# Patient Record
Sex: FEMALE | Race: BLACK OR AFRICAN AMERICAN | NOT HISPANIC OR LATINO | Employment: FULL TIME | URBAN - METROPOLITAN AREA
[De-identification: names, ages, dates, MRNs, and addresses within clinical notes are randomized per-mention and may not be internally consistent; named-entity substitution may affect disease eponyms.]

---

## 2018-09-12 ENCOUNTER — HOSPITAL ENCOUNTER (EMERGENCY)
Facility: HOSPITAL | Age: 48
Discharge: HOME | End: 2018-09-12
Attending: EMERGENCY MEDICINE

## 2018-09-12 VITALS
OXYGEN SATURATION: 97 % | SYSTOLIC BLOOD PRESSURE: 143 MMHG | DIASTOLIC BLOOD PRESSURE: 63 MMHG | RESPIRATION RATE: 16 BRPM | TEMPERATURE: 98 F | WEIGHT: 156 LBS | HEART RATE: 67 BPM

## 2018-09-12 DIAGNOSIS — M25.441 FINGER JOINT SWELLING, RIGHT: ICD-10-CM

## 2018-09-12 PROCEDURE — 99283 EMERGENCY DEPT VISIT LOW MDM: CPT

## 2018-09-12 RX ORDER — ASPIRIN 325 MG
325 TABLET, DELAYED RELEASE (ENTERIC COATED) ORAL DAILY
COMMUNITY

## 2018-09-12 NOTE — ED PROVIDER NOTES
Encounter Date: 9/12/2018    SCRIBE #1 NOTE: Tori MOREIRA, am scribing for, and in the presence of, Makenna Segovia PA-C.       History     Chief Complaint   Patient presents with    Hand Pain     RIGHT hand middle finger pain and swelling. Difficult to flex. Denies injury       Time seen by provider: 10:46 AM on 09/12/2018    Clarice Marin is a 48 y.o. female with no pertinent PMHx who presents to the ED with complaints of right middle finger pain with an onset x 2-3 days PTA. The patient relays that she has been having constant and throbbing pain in her right 3rd digit. She denies any recent injury. 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 Patient states that the finger is very swollen, which makes it difficult for her to bend. The pain is worse in the middle of the finger. She claims that she could not sleep last night due to the pain. She denies any history of gout. Patient denies any relief. She states that nothing makes the pain better.         The history is provided by the patient.     Review of patient's allergies indicates:  No Known Allergies  History reviewed. No pertinent past medical history.  History reviewed. No pertinent surgical history.  History reviewed. No pertinent family history.  Social History     Tobacco Use    Smoking status: Current Every Day Smoker     Packs/day: 0.50    Smokeless tobacco: Never Used   Substance Use Topics    Alcohol use: Yes    Drug use: No     Review of Systems   Constitutional: Negative for activity change, appetite change, chills and fever.   HENT: Negative for congestion, rhinorrhea and sore throat.    Eyes: Negative for redness and visual disturbance.   Respiratory: Negative for cough, chest tightness and shortness of breath.    Cardiovascular: Negative for chest pain.   Gastrointestinal: Negative for abdominal pain, diarrhea, nausea and vomiting.   Genitourinary: Negative for dysuria and  frequency.   Musculoskeletal: Positive for arthralgias (right 3rd digit ). Negative for back pain, neck pain and neck stiffness.   Skin: Negative for rash.        Positive right 3rd digit swelling    Neurological: Negative for dizziness, syncope, numbness and headaches.       Physical Exam     Initial Vitals [09/12/18 0921]   BP Pulse Resp Temp SpO2   (!) 143/63 67 16 98.4 °F (36.9 °C) 97 %      MAP       --         Physical Exam    Nursing note and vitals reviewed.  Constitutional: She appears well-developed and well-nourished. She is cooperative.  Non-toxic appearance. She does not have a sickly appearance.   HENT:   Head: Normocephalic and atraumatic.   Right Ear: Abnromal external ear normal.   Left Ear: Abnormal external ear normal.   Nose: Nose abnormal.   Mouth/Throat: Oropharynx is clear and moist.   Eyes: Conjunctivae and lids are normal. Pupils are equal, round, and reactive to light.   Neck: Normal range of motion and full passive range of motion without pain. Neck supple.   Cardiovascular: Normal rate, regular rhythm and normal heart sounds. Exam reveals no gallop and no friction rub.    No murmur heard.  Pulmonary/Chest: Breath sounds normal. She has no wheezes. She has no rhonchi. She has no rales.   Abdominal: Normal appearance. There is no rigidity.   Musculoskeletal:        Right hand: She exhibits decreased range of motion (3rd right digit, due to pain), tenderness (3rd right digit) and swelling (3rd right digit).   There is no erythema or warmth noted to the 3rd right digit.    Neurological: She is alert and oriented to person, place, and time.   Skin: Skin is warm and dry. Abrasion noted. No rash noted. No erythema.   Capillary refill is less than 3 seconds.   There are old superficial abrasions noted diffusely to the the bilateral hands.          ED Course   Procedures  Labs Reviewed - No data to display       Imaging Results          X-Ray Hand 3 View Right (Final result)  Result time 09/12/18  10:38:06   Procedure changed from X-Ray Hand 2 View Right     Final result by Alejandra Zarco MD (09/12/18 10:38:06)                 Impression:      No acute fracture or dislocation right hand.      Electronically signed by: Alejandra Zarco MD  Date:    09/12/2018  Time:    10:38             Narrative:    EXAMINATION:  XR HAND COMPLETE 3 VIEW RIGHT    CLINICAL HISTORY:  finger joint swelling ;  Effusion, right hand    TECHNIQUE:  Three views right hand    COMPARISON:  None    FINDINGS:  No acute fracture or dislocation right hand.  No focal osseous destruction.  No erosive changes.                                 Medical Decision Making:   History:   Old Medical Records: I decided to obtain old medical records.  Clinical Tests:   Radiological Study: Ordered and Reviewed       APC / Resident Notes:   Urgent evaluation of a 48-year-old female who presents with swelling and tenderness to the 3rd digit of the right hand.  She denies specific injury. 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  There is no erythema or warmth.  She is neurovascularly intact.  X-ray is normal. Patient eloped from the ER prior to completion of evaluation.        Scribe Attestation:   Scribe #1: I performed the above scribed service and the documentation accurately describes the services I performed. I attest to the accuracy of the note.    I, Makenna Segovia PA-C, personally performed the services described in this documentation. All medical record entries made by the scribe were at my direction and in my presence.  I have reviewed the chart and agree that the record reflects my personal performance and is accurate and complete. Makenna Segovia PA-C.  5:02 PM 09/12/2018             Clinical Impression:   The encounter diagnosis was Finger joint swelling, right.      Disposition:   Disposition: Eloped                        Makenna Segovia PA-C  09/12/18 170

## 2024-01-03 ENCOUNTER — OCCUPATIONAL HEALTH (OUTPATIENT)
Dept: URGENT CARE | Facility: CLINIC | Age: 54
End: 2024-01-03

## 2024-01-03 DIAGNOSIS — Z13.9 ENCOUNTER FOR SCREENING: Primary | ICD-10-CM

## 2024-01-03 LAB
COLLECTION ONLY: NORMAL
TB INDURATION - 48 HR READ: NORMAL
TB INDURATION - 72 HR READ: NORMAL
TB SKIN TEST - 48 HR READ: NORMAL
TB SKIN TEST - 72 HR READ: NORMAL

## 2024-01-03 PROCEDURE — 90686 IIV4 VACC NO PRSV 0.5 ML IM: CPT | Mod: S$GLB,,,

## 2024-01-03 PROCEDURE — 86580 TB INTRADERMAL TEST: CPT | Mod: S$GLB,,,
